# Patient Record
Sex: MALE | Race: ASIAN | NOT HISPANIC OR LATINO | URBAN - METROPOLITAN AREA
[De-identification: names, ages, dates, MRNs, and addresses within clinical notes are randomized per-mention and may not be internally consistent; named-entity substitution may affect disease eponyms.]

---

## 2024-10-02 ENCOUNTER — EMERGENCY (EMERGENCY)
Age: 39
LOS: 1 days | Discharge: HOME | End: 2024-10-02
Admitting: EMERGENCY MEDICINE
Payer: COMMERCIAL

## 2024-10-02 VITALS
SYSTOLIC BLOOD PRESSURE: 138 MMHG | TEMPERATURE: 98 F | RESPIRATION RATE: 16 BRPM | DIASTOLIC BLOOD PRESSURE: 82 MMHG | OXYGEN SATURATION: 98 % | HEART RATE: 92 BPM | WEIGHT: 154.32 LBS

## 2024-10-02 VITALS
SYSTOLIC BLOOD PRESSURE: 98 MMHG | HEART RATE: 62 BPM | TEMPERATURE: 98 F | RESPIRATION RATE: 16 BRPM | DIASTOLIC BLOOD PRESSURE: 64 MMHG | OXYGEN SATURATION: 100 %

## 2024-10-02 PROCEDURE — 99284 EMERGENCY DEPT VISIT MOD MDM: CPT

## 2024-10-02 PROCEDURE — 73030 X-RAY EXAM OF SHOULDER: CPT | Mod: 26,RT,76

## 2024-10-02 PROCEDURE — 73020 X-RAY EXAM OF SHOULDER: CPT | Mod: 26,59,RT

## 2024-10-02 RX ORDER — IBUPROFEN 600 MG
600 TABLET ORAL ONCE
Refills: 0 | Status: COMPLETED | OUTPATIENT
Start: 2024-10-02 | End: 2024-10-02

## 2024-10-02 RX ORDER — CYCLOBENZAPRINE HCL 10 MG
5 TABLET ORAL ONCE
Refills: 0 | Status: COMPLETED | OUTPATIENT
Start: 2024-10-02 | End: 2024-10-02

## 2024-10-02 RX ORDER — LIDOCAINE/BENZALKONIUM/ALCOHOL
1 SOLUTION, NON-ORAL TOPICAL ONCE
Refills: 0 | Status: COMPLETED | OUTPATIENT
Start: 2024-10-02 | End: 2024-10-02

## 2024-10-02 RX ADMIN — Medication 5 MILLIGRAM(S): at 13:27

## 2024-10-02 RX ADMIN — Medication 600 MILLIGRAM(S): at 13:26

## 2024-10-02 RX ADMIN — Medication 1 PATCH: at 13:26

## 2024-10-02 NOTE — ED PROVIDER NOTE - PHYSICAL EXAMINATION
MSK: no obvious deformity to right shoulder, +ttp along anterior shoulder near bicepital groove, +ttp posterior shoulder along scapula, limited ROM of shoulder in overhead positions, FROM in adduction and abduction  +paraspinal right thoracic muscle tenderness  no midline spinal tenderness, no c-spine tenderness  strength 5/5 in all extremities,  strength 5/5 and equal b/l  sensation intact and equal b/l, distal pulses intact and equal b/l

## 2024-10-02 NOTE — ED ADULT NURSE REASSESSMENT NOTE - NS ED NURSE REASSESS COMMENT FT1
Patient resting in stretcher, at baseline mental status, no acute distress noted at this time. Respirations equal and unlabored. Pt reported partial relief of pain. Care plan continued. Comfort measures provided. Safety maintained. Awaiting imaging.

## 2024-10-02 NOTE — ED PROVIDER NOTE - PROGRESS NOTE DETAILS
GURPREET Nieves: xray without evidence of fracture or dislocation. Pt reports improvement in pain, recommend outpt MRI, provided sling that pt can use as needed GURPREET Nieves: Initial xray showing minimal change of humeral head in internal and external rotation, likely due to patient positioning, per xray read can be seen with posterior dislocation. No obvious deformity on exam, near FROM (limited in overhead). Repeat xray performed with axillary view, discussed with radiology and ortho, no evidence of fracture or dislocation. Pt reports improvement in pain, recommend outpt MRI, provided sling that pt can use as needed. Pt will return to the ER with any worsening or concerning symptoms.
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
## 2024-10-02 NOTE — ED STATDOCS - PHYSICAL EXAMINATION
VITALS:   T(C): 36.7 (10-02-24 @ 10:15), Max: 36.7 (10-02-24 @ 10:15)  HR: 92 (10-02-24 @ 10:15) (92 - 92)  BP: 138/82 (10-02-24 @ 10:15) (138/82 - 138/82)  RR: 16 (10-02-24 @ 10:15) (16 - 16)  SpO2: 98% (10-02-24 @ 10:15) (98% - 98%)    GENERAL: NAD, lying in bed comfortably  HEAD:  Atraumatic, normocephalic  EYES: EOMI, PERRLA, conjunctiva and sclera clear  ENT: Moist mucous membranes  NECK: Supple, no JVD  HEART: Regular rate and rhythm, no murmurs, rubs, or gallops  LUNGS: Unlabored respirations.  Clear to auscultation bilaterally, no crackles, wheezing, or rhonchi  ABDOMEN: Soft, nontender, nondistended, +BS  MSK: R. arm impaired ROM at shoulder joint. +drop can test. +impaired internal rotation of shoulder. point tenderness over proximal humerus.   NERVOUS SYSTEM:  A&Ox3, no focal deficits . CN II - VII intact. R.  strength 4/5, LUE strength 5/5. strength 5/5 in lower ext BL. sensation intact  in all extremities.  SKIN: No rashes or lesions VITALS:   T(C): 36.7 (10-02-24 @ 10:15), Max: 36.7 (10-02-24 @ 10:15)  HR: 92 (10-02-24 @ 10:15) (92 - 92)  BP: 138/82 (10-02-24 @ 10:15) (138/82 - 138/82)  RR: 16 (10-02-24 @ 10:15) (16 - 16)  SpO2: 98% (10-02-24 @ 10:15) (98% - 98%)    GENERAL: NAD, lying in bed comfortably  HEAD:  Atraumatic, normocephalic  EYES: Conjunctiva and sclera clear  ENT: Moist mucous membranes  NECK: Supple, no JVD, FROM in neck  HEART: Regular rate and rhythm, no murmurs, rubs, or gallops  LUNGS: Unlabored respirations.  Clear to auscultation bilaterally, no crackles, wheezing, or rhonchi  ABDOMEN: Soft, nontender, nondistended  MSK: R. arm impaired ROM at shoulder joint. +drop can test. +impaired internal rotation of shoulder. point tenderness over proximal humerus.   NERVOUS SYSTEM:  A&Ox3, no focal deficits . CN II - VII intact. R.  strength 4/5, LUE strength 5/5. strength 5/5 in lower ext BL. sensation intact  in all extremities.  SKIN: No rashes or lesions VITALS:   T(C): 36.7 (10-02-24 @ 10:15), Max: 36.7 (10-02-24 @ 10:15)  HR: 92 (10-02-24 @ 10:15) (92 - 92)  BP: 138/82 (10-02-24 @ 10:15) (138/82 - 138/82)  RR: 16 (10-02-24 @ 10:15) (16 - 16)  SpO2: 98% (10-02-24 @ 10:15) (98% - 98%)    GENERAL: NAD, lying in bed comfortably  HEAD:  Atraumatic, normocephalic  EYES: Conjunctiva and sclera clear  ENT: Moist mucous membranes  NECK: Supple, no JVD, FROM in neck  HEART: Regular rate and rhythm, no murmurs, rubs, or gallops  LUNGS: Unlabored respirations.  Clear to auscultation bilaterally, no crackles, wheezing, or rhonchi  MSK: R. arm impaired ROM at shoulder joint. +drop can test. +impaired internal rotation of shoulder. point tenderness over proximal humerus.   NERVOUS SYSTEM:  R.  strength 4/5, LUE strength 5/5. strength 5/5 in lower ext BL. sensation intact  in all extremities.  SKIN: No rashes or lesions
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
## 2024-10-02 NOTE — ED STATDOCS - ATTENDING CONTRIBUTION TO CARE
Attending Contribution to Care: PEM ATTENDING ADDENDUM   I personally performed a history and physical examination, and discussed the management with the trainee. I reviewed the assessment and plan documented by the trainee. I made modifications to the documentation above as I felt appropriate, and concur with what is documented above unless otherwise noted below.  I personally reviewed the diagnostic studies obtained

## 2024-10-02 NOTE — ED PROVIDER NOTE - PATIENT PORTAL LINK FT
You can access the FollowMyHealth Patient Portal offered by [facility] by registering at the following website: [email or web address]. By joining [facility]’s FollowMyHealth portal, you will also be able to view your health information using other applications (apps) compatible with our system.
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
## 2024-10-02 NOTE — ED ADULT NURSE NOTE - NSFALLUNIVINTERV_ED_ALL_ED
Bed/Stretcher in lowest position, wheels locked, appropriate side rails in place/Call bell, personal items and telephone in reach/Instruct patient to call for assistance before getting out of bed/chair/stretcher/Non-slip footwear applied when patient is off stretcher/[place] to call system/Physically safe environment - no spills, clutter or unnecessary equipment/Purposeful proactive rounding/Room/bathroom lighting operational, light cord in reach

## 2024-10-02 NOTE — ED PROVIDER NOTE - NSFOLLOWUPINSTRUCTIONS_ED_ALL_ED_FT
Follow-up with your primary care doctor within 1 week  Follow-up with an orthopedic doctor if shoulder pain persists, discuss having MRI, office information attached please call to make an appointment  Take ibuprofen 600 mg every 6/8 hours as needed for pain  – You can also take Tylenol 650 mg every 6 hours  Take Cyclobenzaprine 5mg (1 tablet) every 8 hours as needed for muscle spasm, caution drowsiness do not drive or drink alcohol while taking   Use sling as needed for comfort but do not use  Return to the ER with any worsening or concerning symptoms increased pain, numbness/tingling, weakness or any other concerns.

## 2024-10-02 NOTE — ED PROVIDER NOTE - CARE PROVIDER_API CALL
Vito Mittal  Orthopaedic Surgery  [address], [address]  [place] [address]  Phone: [phone]  Fax: [phone]  Follow Up Time: